# Patient Record
Sex: MALE | Race: BLACK OR AFRICAN AMERICAN | URBAN - METROPOLITAN AREA
[De-identification: names, ages, dates, MRNs, and addresses within clinical notes are randomized per-mention and may not be internally consistent; named-entity substitution may affect disease eponyms.]

---

## 2018-07-16 ENCOUNTER — HOSPITAL ENCOUNTER (OUTPATIENT)
Facility: CLINIC | Age: 38
Setting detail: OBSERVATION
Discharge: HOME | End: 2018-07-17
Attending: EMERGENCY MEDICINE | Admitting: INTERNAL MEDICINE
Payer: OTHER GOVERNMENT

## 2018-07-16 ENCOUNTER — APPOINTMENT (OUTPATIENT)
Dept: CT IMAGING | Facility: CLINIC | Age: 38
End: 2018-07-16
Attending: EMERGENCY MEDICINE
Payer: OTHER GOVERNMENT

## 2018-07-16 ENCOUNTER — APPOINTMENT (OUTPATIENT)
Dept: ULTRASOUND IMAGING | Facility: CLINIC | Age: 38
End: 2018-07-16
Attending: EMERGENCY MEDICINE
Payer: OTHER GOVERNMENT

## 2018-07-16 DIAGNOSIS — M25.561 ACUTE PAIN OF RIGHT KNEE: ICD-10-CM

## 2018-07-16 DIAGNOSIS — E78.2 MIXED HYPERLIPIDEMIA: Primary | ICD-10-CM

## 2018-07-16 DIAGNOSIS — R07.9 CHEST PAIN, UNSPECIFIED TYPE: ICD-10-CM

## 2018-07-16 DIAGNOSIS — E11.9 TYPE 2 DIABETES MELLITUS WITHOUT COMPLICATION, WITHOUT LONG-TERM CURRENT USE OF INSULIN (H): ICD-10-CM

## 2018-07-16 LAB
ALBUMIN SERPL-MCNC: 4.2 G/DL (ref 3.4–5)
ALP SERPL-CCNC: 59 U/L (ref 40–150)
ALT SERPL W P-5'-P-CCNC: 33 U/L (ref 0–70)
ANION GAP SERPL CALCULATED.3IONS-SCNC: 9 MMOL/L (ref 3–14)
AST SERPL W P-5'-P-CCNC: 38 U/L (ref 0–45)
BASOPHILS # BLD AUTO: 0 10E9/L (ref 0–0.2)
BASOPHILS NFR BLD AUTO: 0.2 %
BILIRUB SERPL-MCNC: 0.3 MG/DL (ref 0.2–1.3)
BUN SERPL-MCNC: 15 MG/DL (ref 7–30)
CALCIUM SERPL-MCNC: 9.1 MG/DL (ref 8.5–10.1)
CHLORIDE SERPL-SCNC: 98 MMOL/L (ref 94–109)
CO2 SERPL-SCNC: 27 MMOL/L (ref 20–32)
CREAT SERPL-MCNC: 1.1 MG/DL (ref 0.66–1.25)
D DIMER PPP FEU-MCNC: NORMAL UG/ML FEU (ref 0–0.5)
DIFFERENTIAL METHOD BLD: NORMAL
EOSINOPHIL # BLD AUTO: 0.1 10E9/L (ref 0–0.7)
EOSINOPHIL NFR BLD AUTO: 0.7 %
ERYTHROCYTE [DISTWIDTH] IN BLOOD BY AUTOMATED COUNT: 13 % (ref 10–15)
GFR SERPL CREATININE-BSD FRML MDRD: 74 ML/MIN/1.7M2
GLUCOSE SERPL-MCNC: 137 MG/DL (ref 70–99)
HCT VFR BLD AUTO: 40.7 % (ref 40–53)
HGB BLD-MCNC: 14 G/DL (ref 13.3–17.7)
IMM GRANULOCYTES # BLD: 0 10E9/L (ref 0–0.4)
IMM GRANULOCYTES NFR BLD: 0.3 %
INTERPRETATION ECG - MUSE: NORMAL
LYMPHOCYTES # BLD AUTO: 2.4 10E9/L (ref 0.8–5.3)
LYMPHOCYTES NFR BLD AUTO: 26.8 %
MCH RBC QN AUTO: 31.7 PG (ref 26.5–33)
MCHC RBC AUTO-ENTMCNC: 34.4 G/DL (ref 31.5–36.5)
MCV RBC AUTO: 92 FL (ref 78–100)
MONOCYTES # BLD AUTO: 1 10E9/L (ref 0–1.3)
MONOCYTES NFR BLD AUTO: 10.8 %
NEUTROPHILS # BLD AUTO: 5.5 10E9/L (ref 1.6–8.3)
NEUTROPHILS NFR BLD AUTO: 61.2 %
NRBC # BLD AUTO: 0 10*3/UL
NRBC BLD AUTO-RTO: 0 /100
PLATELET # BLD AUTO: 265 10E9/L (ref 150–450)
POTASSIUM SERPL-SCNC: 3.7 MMOL/L (ref 3.4–5.3)
PROT SERPL-MCNC: 8.3 G/DL (ref 6.8–8.8)
RBC # BLD AUTO: 4.41 10E12/L (ref 4.4–5.9)
SODIUM SERPL-SCNC: 134 MMOL/L (ref 133–144)
TROPONIN I SERPL-MCNC: <0.015 UG/L (ref 0–0.04)
WBC # BLD AUTO: 9 10E9/L (ref 4–11)

## 2018-07-16 PROCEDURE — 99285 EMERGENCY DEPT VISIT HI MDM: CPT | Mod: 25

## 2018-07-16 PROCEDURE — 99220 ZZC INITIAL OBSERVATION CARE,LEVL III: CPT | Performed by: INTERNAL MEDICINE

## 2018-07-16 PROCEDURE — 25000128 H RX IP 250 OP 636: Performed by: EMERGENCY MEDICINE

## 2018-07-16 PROCEDURE — 84484 ASSAY OF TROPONIN QUANT: CPT | Performed by: EMERGENCY MEDICINE

## 2018-07-16 PROCEDURE — 93971 EXTREMITY STUDY: CPT | Mod: RT

## 2018-07-16 PROCEDURE — 25000125 ZZHC RX 250: Performed by: EMERGENCY MEDICINE

## 2018-07-16 PROCEDURE — 93005 ELECTROCARDIOGRAM TRACING: CPT

## 2018-07-16 PROCEDURE — 96374 THER/PROPH/DIAG INJ IV PUSH: CPT | Mod: 59

## 2018-07-16 PROCEDURE — 85025 COMPLETE CBC W/AUTO DIFF WBC: CPT | Performed by: EMERGENCY MEDICINE

## 2018-07-16 PROCEDURE — 80053 COMPREHEN METABOLIC PANEL: CPT | Performed by: EMERGENCY MEDICINE

## 2018-07-16 PROCEDURE — 71260 CT THORAX DX C+: CPT

## 2018-07-16 RX ORDER — KETOROLAC TROMETHAMINE 15 MG/ML
15 INJECTION, SOLUTION INTRAMUSCULAR; INTRAVENOUS ONCE
Status: COMPLETED | OUTPATIENT
Start: 2018-07-16 | End: 2018-07-16

## 2018-07-16 RX ORDER — IOPAMIDOL 755 MG/ML
78 INJECTION, SOLUTION INTRAVASCULAR ONCE
Status: COMPLETED | OUTPATIENT
Start: 2018-07-16 | End: 2018-07-16

## 2018-07-16 RX ADMIN — SODIUM CHLORIDE 100 ML: 900 INJECTION, SOLUTION INTRAVENOUS at 22:01

## 2018-07-16 RX ADMIN — KETOROLAC TROMETHAMINE 15 MG: 15 INJECTION, SOLUTION INTRAMUSCULAR; INTRAVENOUS at 21:22

## 2018-07-16 RX ADMIN — IOPAMIDOL 78 ML: 755 INJECTION, SOLUTION INTRAVENOUS at 22:01

## 2018-07-16 ASSESSMENT — ENCOUNTER SYMPTOMS
MYALGIAS: 1
ARTHRALGIAS: 1
JOINT SWELLING: 1

## 2018-07-16 NOTE — IP AVS SNAPSHOT
MRN:[id number]                      After Visit Summary   7/16/2018    Hardik Ramirez    MRN: [id number]           Thank you!     Thank you for choosing [facility] for your care. Our goal is always to provide you with excellent care. Hearing back from our patients is one way we can continue to improve our services. Please take a few minutes to complete the written survey that you may receive in the mail after you visit with us. Thank you!        Patient Information     Date Of Birth          1980        About your hospital stay     You were admitted on:  July 17, 2018 You last received care in the:  [facility]    You were discharged on:  July 17, 2018        Reason for your hospital stay       You were admitted with chest discomfort and found to have left ventricular hypertrophy and type 2 diabetes. You did not have a heart attack or emergent heart problem.                  Who to Call     For medical emergencies, please call 911.  For non-urgent questions about your medical care, please call your primary care provider or clinic, None          Attending Provider     Provider Specialty    Antonio Haile MD Emergency Medicine    Debroux, Myrna TANNER MD Emergency Medicine    Roe, Kings Alvarado MD Internal Medicine       Primary Care Provider Fax #    Provider Not In System [phone]      After Care Instructions     Activity       Your activity upon discharge: activity as tolerated            Diet       Follow this diet upon discharge: Low fat/cholesterol, consistent carbohydrate diet, avoid added sugars.            Discharge Instructions       Call Dr. Lind at Pager [phone] if questions, or Cell Phone [phone].            Monitor and record       blood glucose 2-4 times a day, before meals and at bedtime and report findings to primary care provider                  Follow-up Appointments     Follow-up and recommended labs and tests        Follow up with primary care  "provider, within 7-14 days to evaluate medication change, to evaluate treatment change, for hospital follow- up and regarding new diagnosis.  The following labs/tests are recommended: repeat lipid panel and HbA1c in 3-6 months. Keep close eye on hypertension given new diagnosis of left ventricular hypertrophy and diabetes type 2.   Need to set up/arrange [facility] with [facility] or [facility].                  Pending Results     No orders found for last 3 day(s).            Statement of Approval     Ordered          18 1320  I have reviewed and agree with all the recommendations and orders detailed in this document.  EFFECTIVE NOW     Approved and electronically signed by:  Sandra Hampton PA-C             Admission Information     Date & Time Provider Department Dept. Phone    2018 Roe, Kings Alvarado MD [facility] [phone]      Your Vitals Were     Blood Pressure Pulse Temperature Respirations Height Weight    141/87 88 96.2  F (35.7  C) (Oral) 16 1.778 m (5' 10\") 101.5 kg (223 lb 12.8 oz)    Pulse Oximetry BMI (Body Mass Index)                96% 32.11 kg/m2          [employer] Information     [employer] lets you send messages to your doctor, view your test results, renew your prescriptions, schedule appointments and more. To sign up, go to [email or web address] . Click on \"Log in\" on the left side of the screen, which will take you to the Welcome page. Then click on \"Sign up Now\" on the right side of the page.     You will be asked to enter the access code listed below, as well as some personal information. Please follow the directions to create your username and password.     Your access code is: [email or web address]  Expires: 10/15/2018  1:23 PM     Your access code will  in 90 days. If you need help or a new code, please call your [facility] clinic or [phone].        Care EveryWhere ID     This is your Care EveryWhere ID. This could be used by " other organizations to access your [facility] medical records  [id number]        Equal Access to Services     ONEIL TORRES : Madhav Ponce, wajoshuada crista, luis albertojavon de jesusaniyahkatiuska rodriguez, ozzie joshilibbycheyanne paul. So [facility] [phone].    ATENCIÓN: Si habla español, tiene a gallagher disposición servicios gratuitos de asistencia lingüística. Llame al [phone].    We comply with applicable federal civil rights laws and [place] laws. We do not discriminate on the basis of race, color, national origin, age, disability, sex, sexual orientation, or gender identity.               Review of your medicines      START taking        Dose / Directions    alcohol swab prep pads        Use to swab area of injection/kevyn as directed.   Quantity:  100 each   Refills:  0       aspirin 81 MG EC tablet   Used for:  Mixed hyperlipidemia        Dose:  81 mg   Start taking on:  7/18/2018   Take 1 tablet (81 mg) by mouth daily   Quantity:  30 tablet   Refills:  0       blood glucose calibration solution   Commonly known as:  NO BRAND SPECIFIED        Use to calibrate blood glucose monitor as needed as directed.   Quantity:  1 Bottle   Refills:  0       blood glucose monitoring meter device kit   Commonly known as:  no brand specified        Use to test blood sugar 2-4 times daily or as directed.   Quantity:  1 kit   Refills:  0       blood glucose monitoring test strip   Commonly known as:  no brand specified        Use to test blood sugar 4 times daily or as directed.   Quantity:  100 strip   Refills:  0       thin lancets   Commonly known as:  NO BRAND SPECIFIED        Use with lanceting device.   Quantity:  100 each   Refills:  0         CONTINUE these medicines which may have CHANGED, or have new prescriptions. If we are uncertain of the size of tablets/capsules you have at home, strength may be listed as something that might have changed.        Dose / Directions    rosuvastatin 10 MG tablet   Commonly  known as:  CRESTOR   This may have changed:  how much to take   Used for:  Mixed hyperlipidemia        Dose:  20 mg   Take 2 tablets (20 mg) by mouth daily   Quantity:  30 tablet   Refills:  0         CONTINUE these medicines which have NOT CHANGED        Dose / Directions    AMBIEN PO        Dose:  10 mg   Take 10 mg by mouth nightly as needed for sleep   Refills:  0       IBUPROFEN PO        Dose:  200 mg   Take 200 mg by mouth every 6 hours as needed   Refills:  0       lisinopril-hydrochlorothiazide 20-25 MG per tablet   Commonly known as:  PRINZIDE/ZESTORETIC        Dose:  1 tablet   Take 1 tablet by mouth daily   Refills:  0       multivitamin, therapeutic with minerals Tabs tablet        Dose:  1 tablet   Take 1 tablet by mouth daily   Refills:  0       UNKNOWN TO PATIENT        daily Acid reflux med (suspected PPI)  Unable to verify at this point   Refills:  0            Where to get your medicines      These medications were sent to [facility] - [address]  [address], [place] [address]     Phone:  [phone]     aspirin 81 MG EC tablet    rosuvastatin 10 MG tablet         Some of these will need a paper prescription and others can be bought over the counter. Ask your nurse if you have questions.     Bring a paper prescription for each of these medications     alcohol swab prep pads    blood glucose calibration solution    blood glucose monitoring meter device kit    blood glucose monitoring test strip    thin lancets                Protect others around you: Learn how to safely use, store and throw away your medicines at www.disposemymeds.org.             Medication List: This is a list of all your medications and when to take them. Check marks below indicate your daily home schedule. Keep this list as a reference.      Medications           Morning Afternoon Evening Bedtime As Needed    alcohol swab prep pads   Use to swab area of injection/kevyn as  directed.                                AMBIEN PO   Take 10 mg by mouth nightly as needed for sleep   Last time this was given:  Resume as you normally do at home                                aspirin 81 MG EC tablet   Take 1 tablet (81 mg) by mouth daily   Start taking on:  7/18/2018                                blood glucose calibration solution   Commonly known as:  NO BRAND SPECIFIED   Use to calibrate blood glucose monitor as needed as directed.                                blood glucose monitoring meter device kit   Commonly known as:  no brand specified   Use to test blood sugar 2-4 times daily or as directed.                                blood glucose monitoring test strip   Commonly known as:  no brand specified   Use to test blood sugar 4 times daily or as directed.                                IBUPROFEN PO   Take 200 mg by mouth every 6 hours as needed   Last time this was given:  Resume as you normally do at home                                  lisinopril-hydrochlorothiazide 20-25 MG per tablet   Commonly known as:  PRINZIDE/ZESTORETIC   Take 1 tablet by mouth daily   Last time this was given:  1 tablet on 7/17/2018 11:45 AM   Last time this was given:  Next dose due tomorrow                                 multivitamin, therapeutic with minerals Tabs tablet   Take 1 tablet by mouth daily   Last time this was given:  Resume as you normally do at home                                  rosuvastatin 10 MG tablet   Commonly known as:  CRESTOR   Take 2 tablets (20 mg) by mouth daily   Last time this was given:  Resume tomorrow                                thin lancets   Commonly known as:  NO BRAND SPECIFIED   Use with lanceting device.                                UNKNOWN TO PATIENT   daily Acid reflux med (suspected PPI)  Unable to verify at this point   Last time this was given:  Resume as you normally do at home

## 2018-07-16 NOTE — IP AVS SNAPSHOT
[facility]    [address] [place] [id number]    Phone:  [phone]                                       After Visit Summary   7/16/2018    Hardik Ramirez    MRN: [id number]           After Visit Summary Signature Page     I have received my discharge instructions, and my questions have been answered. I have discussed any challenges I see with this plan with the nurse or doctor.    ..........................................................................................................................................  Patient/Patient Representative Signature      ..........................................................................................................................................  Patient Representative Print Name and Relationship to Patient    ..................................................               ................................................  Date                                            Time    ..........................................................................................................................................  Reviewed by Signature/Title    ...................................................              ..............................................  Date                                                            Time

## 2018-07-17 ENCOUNTER — APPOINTMENT (OUTPATIENT)
Dept: CARDIOLOGY | Facility: CLINIC | Age: 38
End: 2018-07-17
Attending: INTERNAL MEDICINE
Payer: OTHER GOVERNMENT

## 2018-07-17 VITALS
BODY MASS INDEX: 32.04 KG/M2 | OXYGEN SATURATION: 96 % | WEIGHT: 223.8 LBS | SYSTOLIC BLOOD PRESSURE: 141 MMHG | DIASTOLIC BLOOD PRESSURE: 87 MMHG | RESPIRATION RATE: 16 BRPM | HEIGHT: 70 IN | HEART RATE: 88 BPM | TEMPERATURE: 96.2 F

## 2018-07-17 PROBLEM — E11.9 TYPE 2 DIABETES MELLITUS (H): Status: ACTIVE | Noted: 2018-07-17

## 2018-07-17 PROBLEM — M25.561 RIGHT KNEE PAIN: Status: ACTIVE | Noted: 2018-07-17

## 2018-07-17 PROBLEM — R07.9 CHEST PAIN: Status: ACTIVE | Noted: 2018-07-17

## 2018-07-17 LAB
CHOLEST SERPL-MCNC: 175 MG/DL
D DIMER PPP FEU-MCNC: <0.3 UG/ML FEU (ref 0–0.5)
HBA1C MFR BLD: 6.8 % (ref 0–5.6)
HDLC SERPL-MCNC: 39 MG/DL
LDLC SERPL CALC-MCNC: 118 MG/DL
NONHDLC SERPL-MCNC: 136 MG/DL
TRIGL SERPL-MCNC: 91 MG/DL
TROPONIN I SERPL-MCNC: <0.015 UG/L (ref 0–0.04)
TROPONIN I SERPL-MCNC: <0.015 UG/L (ref 0–0.04)

## 2018-07-17 PROCEDURE — G0378 HOSPITAL OBSERVATION PER HR: HCPCS

## 2018-07-17 PROCEDURE — 99217 ZZC OBSERVATION CARE DISCHARGE: CPT | Performed by: PHYSICIAN ASSISTANT

## 2018-07-17 PROCEDURE — 93306 TTE W/DOPPLER COMPLETE: CPT | Mod: 26 | Performed by: INTERNAL MEDICINE

## 2018-07-17 PROCEDURE — 40000264 ECHO COMPLETE WITH OPTISON

## 2018-07-17 PROCEDURE — 80061 LIPID PANEL: CPT | Performed by: INTERNAL MEDICINE

## 2018-07-17 PROCEDURE — 25500064 ZZH RX 255 OP 636: Performed by: INTERNAL MEDICINE

## 2018-07-17 PROCEDURE — 84484 ASSAY OF TROPONIN QUANT: CPT | Performed by: INTERNAL MEDICINE

## 2018-07-17 PROCEDURE — 25000128 H RX IP 250 OP 636: Performed by: INTERNAL MEDICINE

## 2018-07-17 PROCEDURE — 99207 ZZC APP CREDIT; MD BILLING SHARED VISIT: CPT | Performed by: INTERNAL MEDICINE

## 2018-07-17 PROCEDURE — 83036 HEMOGLOBIN GLYCOSYLATED A1C: CPT | Performed by: INTERNAL MEDICINE

## 2018-07-17 PROCEDURE — 85379 FIBRIN DEGRADATION QUANT: CPT | Performed by: EMERGENCY MEDICINE

## 2018-07-17 PROCEDURE — 36415 COLL VENOUS BLD VENIPUNCTURE: CPT | Performed by: INTERNAL MEDICINE

## 2018-07-17 PROCEDURE — 25000132 ZZH RX MED GY IP 250 OP 250 PS 637: Performed by: INTERNAL MEDICINE

## 2018-07-17 RX ORDER — IBUPROFEN 600 MG/1
600 TABLET, FILM COATED ORAL EVERY 6 HOURS PRN
Status: DISCONTINUED | OUTPATIENT
Start: 2018-07-17 | End: 2018-07-17 | Stop reason: HOSPADM

## 2018-07-17 RX ORDER — LANCETS
EACH MISCELLANEOUS
Qty: 100 EACH | Refills: 0 | Status: SHIPPED | OUTPATIENT
Start: 2018-07-17

## 2018-07-17 RX ORDER — LANCETS
EACH MISCELLANEOUS
Qty: 100 EACH | Refills: 0 | Status: SHIPPED | OUTPATIENT
Start: 2018-07-17 | End: 2018-07-17

## 2018-07-17 RX ORDER — NALOXONE HYDROCHLORIDE 0.4 MG/ML
.1-.4 INJECTION, SOLUTION INTRAMUSCULAR; INTRAVENOUS; SUBCUTANEOUS
Status: DISCONTINUED | OUTPATIENT
Start: 2018-07-17 | End: 2018-07-17 | Stop reason: HOSPADM

## 2018-07-17 RX ORDER — GLUCOSAMINE HCL/CHONDROITIN SU 500-400 MG
CAPSULE ORAL
Qty: 100 EACH | Refills: 0 | Status: SHIPPED | OUTPATIENT
Start: 2018-07-17 | End: 2018-07-17

## 2018-07-17 RX ORDER — ROSUVASTATIN CALCIUM 10 MG/1
10 TABLET, COATED ORAL DAILY
Status: ON HOLD | COMMUNITY
End: 2018-07-17

## 2018-07-17 RX ORDER — ACETAMINOPHEN 325 MG/1
650 TABLET ORAL EVERY 4 HOURS PRN
Status: DISCONTINUED | OUTPATIENT
Start: 2018-07-17 | End: 2018-07-17 | Stop reason: HOSPADM

## 2018-07-17 RX ORDER — LISINOPRIL AND HYDROCHLOROTHIAZIDE 20; 25 MG/1; MG/1
1 TABLET ORAL DAILY
COMMUNITY

## 2018-07-17 RX ORDER — GLUCOSAMINE HCL/CHONDROITIN SU 500-400 MG
CAPSULE ORAL
Qty: 100 EACH | Refills: 0 | Status: SHIPPED | OUTPATIENT
Start: 2018-07-17

## 2018-07-17 RX ORDER — ROSUVASTATIN CALCIUM 10 MG/1
20 TABLET, COATED ORAL DAILY
Qty: 30 TABLET | Refills: 0 | Status: SHIPPED | OUTPATIENT
Start: 2018-07-17

## 2018-07-17 RX ORDER — LIDOCAINE 40 MG/G
CREAM TOPICAL
Status: DISCONTINUED | OUTPATIENT
Start: 2018-07-17 | End: 2018-07-17 | Stop reason: HOSPADM

## 2018-07-17 RX ORDER — ASPIRIN 81 MG/1
81 TABLET ORAL DAILY
Status: DISCONTINUED | OUTPATIENT
Start: 2018-07-18 | End: 2018-07-17 | Stop reason: HOSPADM

## 2018-07-17 RX ORDER — MULTIPLE VITAMINS W/ MINERALS TAB 9MG-400MCG
1 TAB ORAL DAILY
COMMUNITY

## 2018-07-17 RX ORDER — LISINOPRIL AND HYDROCHLOROTHIAZIDE 20; 25 MG/1; MG/1
1 TABLET ORAL DAILY
Status: DISCONTINUED | OUTPATIENT
Start: 2018-07-17 | End: 2018-07-17 | Stop reason: HOSPADM

## 2018-07-17 RX ORDER — LIDOCAINE 40 MG/G
CREAM TOPICAL 2 TIMES DAILY PRN
Status: DISCONTINUED | OUTPATIENT
Start: 2018-07-17 | End: 2018-07-17 | Stop reason: HOSPADM

## 2018-07-17 RX ORDER — ACETAMINOPHEN 650 MG/1
650 SUPPOSITORY RECTAL EVERY 4 HOURS PRN
Status: DISCONTINUED | OUTPATIENT
Start: 2018-07-17 | End: 2018-07-17 | Stop reason: HOSPADM

## 2018-07-17 RX ORDER — ALUMINA, MAGNESIA, AND SIMETHICONE 2400; 2400; 240 MG/30ML; MG/30ML; MG/30ML
30 SUSPENSION ORAL EVERY 4 HOURS PRN
Status: DISCONTINUED | OUTPATIENT
Start: 2018-07-17 | End: 2018-07-17 | Stop reason: HOSPADM

## 2018-07-17 RX ORDER — NITROGLYCERIN 0.4 MG/1
0.4 TABLET SUBLINGUAL EVERY 5 MIN PRN
Status: DISCONTINUED | OUTPATIENT
Start: 2018-07-17 | End: 2018-07-17 | Stop reason: HOSPADM

## 2018-07-17 RX ORDER — ASPIRIN 81 MG/1
162 TABLET, CHEWABLE ORAL ONCE
Status: COMPLETED | OUTPATIENT
Start: 2018-07-17 | End: 2018-07-17

## 2018-07-17 RX ADMIN — HUMAN ALBUMIN MICROSPHERES AND PERFLUTREN 9 ML: 10; .22 INJECTION, SOLUTION INTRAVENOUS at 08:46

## 2018-07-17 RX ADMIN — LISINOPRIL AND HYDROCHLOROTHIAZIDE 1 TABLET: 25; 20 TABLET ORAL at 11:45

## 2018-07-17 RX ADMIN — SODIUM CHLORIDE 1000 ML: 9 INJECTION, SOLUTION INTRAVENOUS at 01:57

## 2018-07-17 RX ADMIN — OMEPRAZOLE 40 MG: 20 CAPSULE, DELAYED RELEASE ORAL at 06:54

## 2018-07-17 RX ADMIN — ASPIRIN 81 MG 162 MG: 81 TABLET ORAL at 01:58

## 2018-07-17 NOTE — PHARMACY-ADMISSION MEDICATION HISTORY
Admission medication history interview status for the 7/16/2018  admission is complete. See EPIC admission navigator for prior to admission medications     Medication history source reliability:Good    Actions taken by pharmacist (provider contacted, etc): Attempted to contact [facility] ([phone]) to verify acid reflux medication. Connected to message line- would call back after 24 hours.      Additional medication history information not noted on PTA med list :None    Medication reconciliation/reorder completed by provider prior to medication history? Yes    Time spent in this activity: 10    Prior to Admission medications    Medication Sig Last Dose Taking? Auth Provider   IBUPROFEN PO Take 200 mg by mouth every 6 hours as needed PRn Yes Unknown, Entered By History   lisinopril-hydrochlorothiazide (PRINZIDE/ZESTORETIC) 20-25 MG per tablet Take 1 tablet by mouth daily 7/16/2018 at Unknown time Yes Reported, Patient   multivitamin, therapeutic with minerals (THERA-VIT-M) TABS tablet Take 1 tablet by mouth daily 7/16/2018 at Unknown time Yes Unknown, Entered By History   rosuvastatin (CRESTOR) 10 MG tablet Take 10 mg by mouth daily 7/15/2018 Yes Reported, Patient   UNKNOWN TO PATIENT daily Acid reflux med (suspected PPI)   Unable to verify at this point  Yes Reported, Patient   Zolpidem Tartrate (AMBIEN PO) Take 10 mg by mouth nightly as needed for sleep PRN Yes Unknown, Entered By History

## 2018-07-17 NOTE — ED NOTES
Bed: ED01  Expected date: 7/16/18  Expected time:   Means of arrival:   Comments:  Triage     David Gillespie(Attending)

## 2018-07-17 NOTE — PLAN OF CARE
Problem: Patient Care Overview  Goal: Plan of Care/Patient Progress Review  Outcome: No Change  PRIMARY DIAGNOSIS: CHEST PAIN  OUTPATIENT/OBSERVATION GOALS TO BE MET BEFORE DISCHARGE:  1. Ruled out acute coronary syndrome (negative or stable Troponin):  Yes  2. Pain Status: Pain free.  3. Appropriate provocative testing performed: No  -  Procedure: Echo  - Interpretation of cardiac rhythm per telemetry tech: NSR    4. Cleared by Consultants (if applicable):N/A  5. Return to near baseline physical activity: Yes  Discharge Planner Nurse   Safe discharge environment identified: Yes  Barriers to discharge: Yes       Entered by: Stephania Gonzalez 07/17/2018 9:30 AM     Please review provider order for any additional goals.   Nurse to notify provider when observation goals have been met and patient is ready for discharge.

## 2018-07-17 NOTE — PLAN OF CARE
Problem: Patient Care Overview  Goal: Plan of Care/Patient Progress Review  Outcome: No Change  PRIMARY DIAGNOSIS: CHEST PAIN  OUTPATIENT/OBSERVATION GOALS TO BE MET BEFORE DISCHARGE:  1. Ruled out acute coronary syndrome (negative or stable Troponin):  Yes  2. Pain Status: Pain free.  3. Appropriate provocative testing performed: Yes  -  Procedure: Echo  - Interpretation of cardiac rhythm per telemetry tech: NSR    4. Cleared by Consultants (if applicable):N/A  5. Return to near baseline physical activity: Yes  Discharge Planner Nurse   Safe discharge environment identified: Yes  Barriers to discharge: Yes       Entered by: Stephania Gonzalez 07/17/2018 12:57 PM     Please review provider order for any additional goals.   Nurse to notify provider when observation goals have been met and patient is ready for discharge.

## 2018-07-17 NOTE — PLAN OF CARE
Problem: Patient Care Overview  Goal: Plan of Care/Patient Progress Review  Outcome: No Change  Observation goals PRIOR TO DISCHARGE     Comments: List all goals to be met before discharge home:   - Serial troponins and stress test complete : met  - Seen and cleared by consultant if applicable : not met  - Adequate pain control on oral analgesia : met  - Vital signs normal or at patient baseline : met  - Safe disposition plan has been identified : met  - Nurse to notify provider when observation goals have been met and patient is ready for discharge.     Pt A&Ox4.  VSS on RA.  Tele NSR.  Denies interventions for pain at this time.  CMS intact.  Up independently in room.  IV SL.  Voiding adequately.  Nursing to continue to monitor.

## 2018-07-17 NOTE — H&P
[facility]    History and Physical  Hospitalist       Date of Admission:  7/16/2018    Assessment & Plan   Hardik Ramirez is a 37 year old male who presents with ongoing right knee pain following a minor injury and subsequent effusion 6 days ago as well as onset of left-sided chest pain over the past 24 hours found to have sinus tachycardia, LVH on EKG.    Atypical chest pain, abnormal EKG: Patient with an EKG demonstrating lateral T-wave inversions and electrical pattern consistent with left ventricular hypertrophy.  Suspect that patient does have a component of concentric LVH related to long-standing history of hypertension.  Suspect chest discomfort is actually musculoskeletal in nature as it is reproducible and EKG abnormalities are actually secondary to chronic concentric LVH.  -TTE In a.m.  -Cardiac telemetry  -We will complete troponin trend.  Low suspicion for ACS at this time.  -Given family history of coronary artery disease, hypertension, hyperlipidemia, and what appears to be impaired glucose tolerance, patient likely would benefit from daily 81 mg aspirin for primary prevention.  This can be continued on discharge.  -Daily aspirin  -Acetaminophen, as needed ibuprofen, lidocaine cream for chest wall discomfort.  Would administer aspirin 30 minutes prior to ibuprofen  -With any change in chest discomfort, would repeat EKG to ensure stability, potentially transition to stress imaging of the heart.  At this time, given patient's exercise capacity and lack of prior chest discomfort, low HEART score, no stress imaging has been ordered.    Right knee pain: Suspect meniscal injury.  Patient describes jumping off of the bed of a truck and having initially no pain, later effusion with increased pain.  Effusion has improved since 6/12/18 evaluation in [place].  X-ray report from that time reviewed demonstrating normal joint spaces, no fracture.  -Acetaminophen, ibuprofen  -Can continue brace as  prescribed at outside hospital as this may help with pain over the coming days as needed improves  -Can continue crutches if needed as prescribed at outside hospital, though patient not currently utilizing  -Could use lidocaine Cream for knee pain as well    Hyperlipidemia:  -Continue prior to admission Crestor at discharge  -Repeat lipid panel pending to assess efficacy of hyperlipidemia treatment    Hypertension: Fair control currently with systolic blood pressure in the 140s range.  Patient states that by his home blood pressure cuff, he is typically in the 120 systolic range on his antihypertensive regimen.  -Continue prior to admission lisinopril hydrochlorothiazide    Elevated blood glucose: Possible impaired glucose tolerance.  Last ate beef stew several hours ago.  Patient with a blood glucose in the 130s range in the emergency department.  No history of diabetes.  -Hemoglobin A1c pending for a.m.    # Pain Assessment:  Current Pain Score 7/16/2018   Pain score (0-10) 8   - Hardik is experiencing pain due to suspected musculoskeletal anterior chest wall pain. Pain management was discussed and the plan was created in a collaborative fashion.  Hardik's response to the current recommendations: Engaged   plan is to treat chest discomfort with ibuprofen, acetaminophen, topical lidocaine.  Patient felt nauseous and flushed following tramadol prescribed 7/12 for knee injury.  Would avoid narcotics as I do not see a clear indication for this.    DVT Prophylaxis: Low Risk/Ambulatory with no VTE prophylaxis indicated    Code Status: Full Code    Disposition: Expected discharge 7/17/18 afternoon following TTE results, negative troponin trend.    Kings Alvarado [place]    Primary Care Physician   PCP in [place], [place]    Chief Complaint   Right knee pain, gnawing L chest wall pain.    History is obtained from the patient, chart review, discussion with Dr Mendez in the emergency department, review of outside records including  "recent knee XR report from OSH.    History of Present Illness   Hardik Ramirez is a 37 year old male who presents with right knee pain, secondary complaints of left chest wall discomfort.    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  His primary care provider is stationed in [place], [place].  Last week, 7/12/18, while in [place], patient describes jumping out of the back of a truck while loading and unloading items and injuring his right knee.  Patient states that he initially did not have pain, though over the course of the day, developed a large effusion associated with pain.  Patient subsequently was evaluated at [facility] in [place], [place].  4 view x-ray of the knee was performed demonstrating normal joint spaces, no fracture, and patient was discharged on tramadol for pain control, given a knee brace, and recommended to use crutches.  Patient states he took 1 dose of tramadol, it made him feel flushed and nauseous, and he discontinued this medication.    Since the 12th, Patient's effusion has gone down, minimal residual effusion of right knee.  still with range of motion intact.  Patient is not using crutches, and remains ambulatory. Does have pain, however.  Patient noted worsening right knee pain which he describes as radiating into the back of his knee and slightly down his calf.  Pain is centralized around residual effusion on the anterior lateral aspect of the joint line.  No fevers.  Increased discomfort was associated with a car trip from [place] to [place] for next speaking engagement.    Also during car trip, patient noted a \"gnawing\" left chest wall pain at approximately the T3 level midclavicular.  Chest discomfort is reproducible with palpation.  No injury, patient has not done any heavy lifting or weight training recently.  " Has never had similar discomfort in the past.  Does have a history of acid reflux for which he takes a daily medication.  He cannot recall the name of this medication, though it sounds as though he is taking a proton pump inhibitor.    Patient is a non-smoker.  Patient does have a family history of coronary artery disease in his father, who is currently in his 50s.  Patient personally has a history of hyperlipidemia and hypertension which he describes as controlled on hydrochlorothiazide/lisinopril and Crestor.    In the emergency department an EKG was obtained which demonstrated an electrical pattern consistent with LVH.  Nonspecific T-wave inversions in lateral leads were present.  Hospitalist service was contacted for ACS rule out and evaluation of EKG abnormalities.  Patient states that, approximately 10 years ago while stationed in [facility], he had an EKG for screening purposes and was subsequently referred to an [employer] physician.  States that someone was worried that he was having a heart attack with his EKG.  Never underwent echocardiogram, though was essentially cleared by physician following an extensive interview.  Patient's hypertension history predated this EKG as he has had hypertension greater than 10 years, was actually on a different antihypertensive regimen which was less efficacious at that time per his recollection.     Patient has excellent exercise tolerance at baseline.  States that he does have some degenerative disease in his knees bilaterally (which was not reported on outside x-ray report).  Prior to right knee injury this past week, could run an 8 minute mile without significant dyspnea and no associated chest discomfort.    Patient has never had a history of syncope or near syncope associated with exertion or exercise.  Patient has mild sinus tachycardia currently, is unaware of what his baseline heart rate is.    Past Medical History    I have reviewed this patient's medical history  and updated it with pertinent information if needed.   Hypertension, hyperlipidemia, acid reflux    Past Surgical History   I have reviewed this patient's surgical history and updated it with pertinent information if needed.  No pertinent past surgical history    Prior to Admission Medications   Prior to Admission Medications   Prescriptions Last Dose Informant Patient Reported? Taking?   UNKNOWN TO PATIENT   Yes Yes   Sig: daily Acid reflux med (suspected PPI)   lisinopril-hydrochlorothiazide (PRINZIDE/ZESTORETIC) 20-25 MG per tablet   Yes Yes   Sig: Take 1 tablet by mouth daily   rosuvastatin (CRESTOR) 10 MG tablet   Yes Yes   Sig: Take 10 mg by mouth daily      Facility-Administered Medications: None     Allergies   No Known Allergies    Social History   I have reviewed this patient's social history and updated it with pertinent information if needed. Hardik Ramirez  reports that he has never smoked (Had a cigar once when returning from [facility]). He has never used smokeless tobacco. He reports that he drinks alcohol. He reports that he does not use illicit drugs.     Family History   I have reviewed this patient's family history and updated it with pertinent information if needed.   Father with myocardial infarction in his mid 50s.    Review of Systems   The 10 point Review of Systems is negative other than noted in the HPI or here.   No vomiting  No diarrhea  No shortness of breath  No diaphoresis    Physical Exam   Temp: 97.4  F (36.3  C) Temp src: Oral BP: 144/87   Heart Rate: 113 Resp: 14 SpO2: 98 % O2 Device: None (Room air)    Vital Signs with Ranges  Temp:  [97.4  F (36.3  C)] 97.4  F (36.3  C)  Heart Rate:  [113] 113  Resp:  [14] 14  BP: (144)/(87) 144/87  SpO2:  [96 %-98 %] 98 %  223 lbs 0 oz    Constitutional: no acute distress, alert, conversant, well-developed -American male laying in bed in no discomfort  Eyes: no scleral icterus or injection  HEENT: moist mucous membranes  Respiratory:  breath sounds clear bilaterally to auscultation, no wheezes, no crackles  Cardiovascular: regular rate and rhythm, no murmur  GI: abdomen soft, non-tender, normoactive bowel sounds, no masses  Lymph/Hematologic: no lower extremity swelling  Genitourinary: not examined  Skin: no rashes  Musculoskeletal: muscular tone intact in all extremities.  No crepitus of right knee.  Small anterior lateral effusion without overlying warmth which is improved from prior per patient's report.  Mild tenderness to palpation of lateral joint line on right Knee.  Patient does have some reproducible anterior left-sided chest wall pain at approximately T3 level midclavicular.  Neurologic: mental status grossly intact, no focal deficits, alert  Psychiatric: normal affect, very pleasant    Data   Data reviewed today:  I personally reviewed the EKG tracing showing LVH pattern, lateral T-wave inversions.  Mild tachycardia with heart rate of 101    Recent Labs  Lab 07/16/18  2117   WBC 9.0   HGB 14.0   MCV 92         POTASSIUM 3.7   CHLORIDE 98   CO2 27   BUN 15   CR 1.10   ANIONGAP 9   LOY 9.1   *   ALBUMIN 4.2   PROTTOTAL 8.3   BILITOTAL 0.3   ALKPHOS 59   ALT 33   AST 38   TROPI <0.015       Recent Results (from the past 24 hour(s))   US Lower Extremity Venous Duplex Right    Narrative    VENOUS ULTRASOUND RIGHT LEG  7/16/2018 9:52 PM     HISTORY: pain, dvt;     COMPARISON: None.    FINDINGS:  Examination of the deep veins with graded compression and  color flow Doppler with spectral wave form analysis shows no evidence  of thrombus in the common femoral vein, femoral vein, popliteal vein  or calf veins.  There is no venous insufficiency.  No fluid  collections are identified.      Impression    IMPRESSION: No evidence of deep venous thrombosis.    CHAI WANG MD   Chest CT, IV contrast only - PE protocol    Narrative    CT CHEST PULMONARY EMBOLISM WITH CONTRAST  7/16/2018 10:05 PM    HISTORY: Chest pain, leg pain,  tachycardia, EKG changes, same.    TECHNIQUE: Scans obtained from the apices through the diaphragm with  IV contrast. 78 mL Isovue-370 injected. Radiation dose for this scan  was reduced using automated exposure control, adjustment of the mA  and/or kV according to patient size, or iterative reconstruction  technique.    COMPARISON:  None.    FINDINGS:  Evaluation of the pulmonary arterial system shows no  evidence of embolus. There is no aortic aneurysm or dissection. The  heart size is normal. There is no mediastinal, hilar or axillary lymph  node enlargement. There is mild dependent atelectasis bilaterally. The  lungs are otherwise clear. No pneumothorax or pleural effusion. Images  through the upper abdomen show no acute abnormalities.      Impression    IMPRESSION: There is no pulmonary embolus, aortic aneurysm or  dissection. No acute chest abnormality.     STEPHANIE RDORIGES MD

## 2018-07-17 NOTE — ED NOTES
"[facility]  ED Nurse Handoff Report    ED Chief complaint: Knee Pain (Right knee pain.  Knee swelling, pain since Tues.  Seen on Thurs in [place].  Dx with knee effusion.  Still having pain. ) and Chest Pain (Left chest pain, started yesterday.  )      ED Diagnosis:   Final diagnoses:   Chest pain, unspecified type   Acute pain of right knee       Code Status: Full Code    Allergies: No Known Allergies    Activity level - Baseline/Home:  Independent    Activity Level - Current:   SBA     Needed?: No    Isolation: No  Infection: Not Applicable  Bariatric?: No    Vital Signs:   Vitals:    07/16/18 2046 07/16/18 2151 07/16/18 2300 07/16/18 2315   BP: 144/87      Resp: 14      Temp: 97.4  F (36.3  C)      TempSrc: Oral      SpO2: 97% 97% 96% 98%   Weight: 101.2 kg (223 lb)      Height: 1.803 m (5' 11\")          Cardiac Rhythm: ,        Pain level: 0-10 Pain Scale: 8    Is this patient confused?: No   Bruno - Suicide Severity Rating Scale Completed?  Yes  If yes, what color did the patient score?  White    Patient Report: Initial Complaint: C/O CP and R knee pain  Focused Assessment: A & O.  VSS.  C/O CP, tender with palpation.  R knee pain after jumping off of a truck; pain wraps around to the calf.  Pain decreased some with Toradol.   Tests Performed: labs, US, CT  Abnormal Results:   Results for orders placed or performed during the hospital encounter of 07/16/18 (from the past 24 hour(s))   EKG 12 lead   Result Value Ref Range    Interpretation ECG Click View Image link to view waveform and result    CBC with platelets differential   Result Value Ref Range    WBC 9.0 4.0 - 11.0 10e9/L    RBC Count 4.41 4.4 - 5.9 10e12/L    Hemoglobin 14.0 13.3 - 17.7 g/dL    Hematocrit 40.7 40.0 - 53.0 %    MCV 92 78 - 100 fl    MCH 31.7 26.5 - 33.0 pg    MCHC 34.4 31.5 - 36.5 g/dL    RDW 13.0 10.0 - 15.0 %    Platelet Count 265 150 - 450 10e9/L    Diff Method Automated Method     % Neutrophils 61.2 % "    % Lymphocytes 26.8 %    % Monocytes 10.8 %    % Eosinophils 0.7 %    % Basophils 0.2 %    % Immature Granulocytes 0.3 %    Nucleated RBCs 0 0 /100    Absolute Neutrophil 5.5 1.6 - 8.3 10e9/L    Absolute Lymphocytes 2.4 0.8 - 5.3 10e9/L    Absolute Monocytes 1.0 0.0 - 1.3 10e9/L    Absolute Eosinophils 0.1 0.0 - 0.7 10e9/L    Absolute Basophils 0.0 0.0 - 0.2 10e9/L    Abs Immature Granulocytes 0.0 0 - 0.4 10e9/L    Absolute Nucleated RBC 0.0    D dimer quantitative   Result Value Ref Range    D Dimer Canceled, Test credited 0.0 - 0.50 ug/ml FEU   Comprehensive metabolic panel   Result Value Ref Range    Sodium 134 133 - 144 mmol/L    Potassium 3.7 3.4 - 5.3 mmol/L    Chloride 98 94 - 109 mmol/L    Carbon Dioxide 27 20 - 32 mmol/L    Anion Gap 9 3 - 14 mmol/L    Glucose 137 (H) 70 - 99 mg/dL    Urea Nitrogen 15 7 - 30 mg/dL    Creatinine 1.10 0.66 - 1.25 mg/dL    GFR Estimate 74 >60 mL/min/1.7m2    GFR Estimate If Black 90 >60 mL/min/1.7m2    Calcium 9.1 8.5 - 10.1 mg/dL    Bilirubin Total 0.3 0.2 - 1.3 mg/dL    Albumin 4.2 3.4 - 5.0 g/dL    Protein Total 8.3 6.8 - 8.8 g/dL    Alkaline Phosphatase 59 40 - 150 U/L    ALT 33 0 - 70 U/L    AST 38 0 - 45 U/L   Troponin I   Result Value Ref Range    Troponin I ES <0.015 0.000 - 0.045 ug/L   US Lower Extremity Venous Duplex Right    Narrative    VENOUS ULTRASOUND RIGHT LEG  7/16/2018 9:52 PM     HISTORY: pain, dvt;     COMPARISON: None.    FINDINGS:  Examination of the deep veins with graded compression and  color flow Doppler with spectral wave form analysis shows no evidence  of thrombus in the common femoral vein, femoral vein, popliteal vein  or calf veins.  There is no venous insufficiency.  No fluid  collections are identified.      Impression    IMPRESSION: No evidence of deep venous thrombosis.    CHAI WANG MD   Chest CT, IV contrast only - PE protocol    Narrative    CT CHEST PULMONARY EMBOLISM WITH CONTRAST  7/16/2018 10:05 PM    HISTORY: Chest pain, leg pain,  tachycardia, EKG changes, same.    TECHNIQUE: Scans obtained from the apices through the diaphragm with  IV contrast. 78 mL Isovue-370 injected. Radiation dose for this scan  was reduced using automated exposure control, adjustment of the mA  and/or kV according to patient size, or iterative reconstruction  technique.    COMPARISON:  None.    FINDINGS:  Evaluation of the pulmonary arterial system shows no  evidence of embolus. There is no aortic aneurysm or dissection. The  heart size is normal. There is no mediastinal, hilar or axillary lymph  node enlargement. There is mild dependent atelectasis bilaterally. The  lungs are otherwise clear. No pneumothorax or pleural effusion. Images  through the upper abdomen show no acute abnormalities.      Impression    IMPRESSION: There is no pulmonary embolus, aortic aneurysm or  dissection. No acute chest abnormality.     STEPHANIE RODRIGES MD     Treatments provided: Toradol    Family Comments: NA    OBS brochure/video discussed/provided to patient: Yes    ED Medications:   Medications   ketorolac (TORADOL) injection 15 mg (15 mg Intravenous Given 7/16/18 2122)   100mL saline flush (100 mLs Intravenous Given 7/16/18 2201)   iopamidol (ISOVUE-370) solution 78 mL (78 mLs Intravenous Given 7/16/18 2201)       Drips infusing?:  No    For the majority of the shift this patient was Green.   Interventions performed were NA.    Severe Sepsis OR Septic Shock Diagnosis Present: No      ED NURSE PHONE NUMBER: *[phone]

## 2018-07-17 NOTE — PROGRESS NOTES
Met with the patient at the bedside regarding discharge planning.  He showed me the education books that were provided for Diabetes and Managing diabetes.  He tells me [place] and that he will work with his PCP regarding glucometer and BGM as well as Nutritionist consult.  Patient is A+O x4 independent in the room.  Patient voices no needs for d/c at this time.  Updated bedside RN.

## 2018-07-17 NOTE — PLAN OF CARE
Problem: Patient Care Overview  Goal: Plan of Care/Patient Progress Review  Outcome: No Change  Observation goals PRIOR TO DISCHARGE     Comments: List all goals to be met before discharge home:   - Serial troponins and stress test complete : met  - Seen and cleared by consultant if applicable : not met  - Adequate pain control on oral analgesia : met  - Vital signs normal or at patient baseline : met  - Safe disposition plan has been identified : met  - Nurse to notify provider when observation goals have been met and patient is ready for discharge.

## 2018-07-17 NOTE — PLAN OF CARE
Problem: Patient Care Overview  Goal: Plan of Care/Patient Progress Review  Outcome: Adequate for Discharge Date Met: 07/17/18  Patient is alert and oriented x 4, denies any pain nor SOB,lungs are clear bilateral. Given diabetes education booklet and discharge instruction reviewed with patient and he verbalized understanding. Printed script for blood sugar monitoring supplies sent with patient.

## 2018-07-17 NOTE — ED PROVIDER NOTES
"  History     Chief Complaint:  Knee pain & Chest pain    HPI   Hardik Ramirez is a 37 year old male who presents with knee pain and chest pain. He is in the , and jumped off of a truck on Tuesday (6 days ago) and noticed right knee pain. His knee was also swollen that day, which has since lessened. The pain also goes down the back of his calf. He was seen on Thursday in [place], they took x-rays. He has a secondary complaint of left-sided chest pain which started yesterday. He also notes that he was in [place] a week ago, before getting stationed in [place].     Allergies:  No Known Drug Allergies    Medications:    The patient is not currently taking any prescribed medications.    Past Medical History:    The patient denies any significant past medical history.    Past Surgical History:    The patient does not have any pertinent past surgical history.    Family History:    No past pertinent family history.    Social History:  Tobacco use: No  Alcohol use: Yes  The patient presents on his own.       Review of Systems   Cardiovascular: Positive for chest pain.   Musculoskeletal: Positive for arthralgias, joint swelling and myalgias.   10 point review of systems performed and is negative except as above and in HPI.    Physical Exam   Vitals:  Patient Vitals for the past 24 hrs:   BP Temp Temp src Heart Rate Resp SpO2 Height Weight   07/16/18 2151 - - - - - 97 % - -   07/16/18 2046 144/87 97.4  F (36.3  C) Oral 113 14 97 % 1.803 m (5' 11\") 101.2 kg (223 lb)         Physical Exam    General: Resting on the gurney, very pleasant.  Head:  The scalp, face, and head appear normal  Mouth/Throat: Mucus membranes are moist  CV:  Heart rapid, but regular rate    Normal S1 and S2  No pathological murmur   Resp:  Breath sounds clear and equal bilaterally    Non-labored, no retractions or accessory muscle use    No coarseness    No wheezing   GI:  Abdomen is soft, no rigidity    No tenderness to palpation  MS:  Right " knee tender to palpation. Pain in the right posterior calf. Left anterior chest wall somewhat tender to palpation, no deformity.  Skin:  No rash or lesions noted.  Neuro: Speech is normal and fluent. No apparent deficit.  Psych:  Awake. Alert.  Normal affect.      Appropriate interactions.    Emergency Department Course     ECG:  ECG taken at 2045, ECG read at 2053  Sinus tachycardia  T wave inversion  Possible hypertrophy  Abnormal ECG  Rate 101 bpm. KS interval 168. QRS duration 94. QT/QTc 338/438. P-R-T axes 81,  65,   65.    Imaging:  Radiology findings were communicated with the patient who voiced understanding of the findings.  Chest CT, IV contrast only - PE protocol  IMPRESSION: There is no pulmonary embolus, aortic aneurysm or  dissection. No acute chest abnormality.   Reading per radiology.     US Lower Extremity Venous Duplex Right  IMPRESSION: No evidence of deep venous thrombosis.  Reading per radiology.     Laboratory:  Laboratory findings were communicated with the patient who voiced understanding of the findings.  CBC: AWNL (WBC 9.0, HGB 14.0, )  CMP: Glucose: 137(H) WNL (Creatinine 1.10)  Troponin (Collected 2117): <0.015    Interventions:  2201 Normal Saline 1000 mL IV  2122 Toradol 15 mg IV     Emergency Department Course:  Nursing notes and vitals reviewed.  I performed an exam of the patient as documented above.   IV was inserted and blood was drawn for laboratory testing, results above.  The patient was sent for a CT, US while in the emergency department, results above.     2322 I spoke with Dr. Roe regarding the patient    I discussed the treatment plan with the patient. They expressed understanding of this plan and consented to admission. I discussed the patient with Dr. Roe, who will admit the patient to a monitored bed for further evaluation and treatment.    I personally reviewed the laboratory results with the Patient and answered all related questions prior to  admission.    Impression & Plan      Medical Decision Making:  Hardik Ramirez is a 37 year old male who presents the emergency department complaining of right knee, posterior leg, and chest pain.  The patient had a recent history of bumping his knee but is noted that the pain is now become behind the leg and into the calf and he also has associated chest pain.  On examination his chest pain is most consistent with musculoskeletal chest pain however, he is tachycardic and his EKG shows T-wave inversions in the lateral leads and T-wave changes in V1 through 3 as well as evidence of LVH.  The patient tells me that he had an EKG that was abnormal many years ago but never any investigation including no echo or stress test at that time.  He reports that this EKG [place]  base and there is no way to get the prior EKG for comparison.  Given his symptoms and EKG abnormality I do believe that further evaluation with echo and checking troponins is indicated.  The patient was discussed with Dr. Roe who will place patient on observation for further evaluation of his chest pain and EKG abnormality.  The patient is comfortable this plan and will receive further care on observation.    Diagnosis:    ICD-10-CM    1. Chest pain, unspecified type R07.9    2. Acute pain of right knee M25.561        Disposition:   Admit      Scribe Disclosure:  VALDEMAR, Zulma Ferrera, am serving as a scribe at 9:21 PM on 7/16/2018 to document services personally performed by Myrna Mendez MD, based on my observations and the provider's statements to me.     EMERGENCY DEPARTMENT       Myrna Mendez MD  07/17/18 8911

## 2018-07-17 NOTE — PROGRESS NOTES
RECEIVING UNIT ED HANDOFF REVIEW    ED Nurse Handoff Report was reviewed by: Jahaira Wise on July 17, 2018 at 12:37 AM

## 2021-04-08 ENCOUNTER — OFFICE VISIT CONVERTED (OUTPATIENT)
Dept: ORTHOPEDIC SURGERY | Facility: CLINIC | Age: 41
End: 2021-04-08
Attending: ORTHOPAEDIC SURGERY

## 2021-04-22 ENCOUNTER — HOSPITAL ENCOUNTER (OUTPATIENT)
Dept: MRI IMAGING | Facility: HOSPITAL | Age: 41
Discharge: HOME | End: 2021-04-22
Attending: ORTHOPAEDIC SURGERY

## 2021-04-27 ENCOUNTER — OFFICE VISIT CONVERTED (OUTPATIENT)
Dept: ORTHOPEDIC SURGERY | Facility: CLINIC | Age: 41
End: 2021-04-27
Attending: ORTHOPAEDIC SURGERY

## 2021-05-11 NOTE — H&P
History and Physical      Patient Name: Guanako Martinez   Patient ID: [id number]   Sex: Male   YOB: 1980        Visit Date: April 8, 2021    Provider: Sher Aguirre MD   Location: [facility]   Location Address: [address] [place] [address]  [id number]   Location Phone: [phone]          Chief Complaint  · Left knee pain       History Of Present Illness  Guanako Martinez is a 40 year old male who presents today to [facility].      The patient presents here today for evaluation of his left knee. The patient was doing a pull up and came down on his knee wrong on March 1st. He reports his knee swelled up. He presented to [facility] ER and had x-rays that were negative for a fracture. He has a knee brace. He has no other complaints.       Past Medical History  Seasonal allergies         Past Surgical History  Eye Implant         Medication List  Crestor 20 mg oral tablet; hydrochlorothiazide 12.5 mg oral capsule; Mobic 7.5 mg oral tablet; Naprosyn 500 mg oral tablet; Zanaflex 4 mg oral capsule; Zestril 20 mg oral tablet         Allergy List  NO KNOWN DRUG ALLERGIES       Allergies Reconciled  Family Medical History  Stroke         Social History  Alcohol Use (Current some day); lives alone; Recreational Drug Use (Never); Single.; Tobacco (Never); Working         Review of Systems  · Constitutional  o Denies  o : fever, chills, weight loss  · Cardiovascular  o Denies  o : chest pain, shortness of breath  · Gastrointestinal  o Denies  o : liver disease, heartburn, nausea, blood in stools  · Genitourinary  o Denies  o : painful urination, blood in urine  · Integument  o Denies  o : rash, itching  · Neurologic  o Denies  o : headache, weakness, loss of consciousness  · Musculoskeletal  o Denies  o : painful, swollen joints  · Psychiatric  o Denies  o : drug/alcohol addiction, anxiety, depression      Vitals  Date Time BP Position Site L\R Cuff Size HR RR TEMP (F) WT  HT  BMI kg/m2 BSA  "m2 O2 Sat FR L/min FiO2 HC       04/08/2021 03:53 PM      80 - R   230lbs 0oz 5'  10\" 33 2.27 98 %            Physical Examination  · Constitutional  o Appearance  o : well developed, well-nourished, no obvious deformities present  · Head and Face  o Head  o :   § Inspection  § : normocephalic  o Face  o :   § Inspection  § : no facial lesions  · Eyes  o Conjunctivae  o : conjunctivae normal  o Sclerae  o : sclerae white  · Ears, Nose, Mouth and Throat  o Ears  o :   § External Ears  § : appearance within normal limits  § Hearing  § : intact  o Nose  o :   § External Nose  § : appearance normal  · Neck  o Inspection/Palpation  o : normal appearance  o Range of Motion  o : full range of motion  · Respiratory  o Respiratory Effort  o : breathing unlabored  o Inspection of Chest  o : normal appearance  o Auscultation of Lungs  o : no audible wheezing or rales  · Cardiovascular  o Heart  o : regular rate  · Gastrointestinal  o Abdominal Examination  o : soft and non-tender  · Skin and Subcutaneous Tissue  o General Inspection  o : intact, no rashes  · Psychiatric  o General  o : Alert and oriented x3  o Judgement and Insight  o : judgment and insight intact  o Mood and Affect  o : mood normal, affect appropriate  · Left Knee  o Inspection  o : ROM 0-90 degrees. Stable to anterior and posterior drawer. Stable to varus and valgus stress. Tender to anterior knee and medial joint line. Patella stable to stress. Pain with Isamar's. Mild swelling. No effusion. Positive EHL, FHL, GS, and TA. Sensation intact to light touch all 5 nerves of the foot. Positive Pulses.   · Imaging  o Imaging  o : [facility] X-ray 3/2021- Leftkneeseries demonstratingminimalpatellarspurring. Smalljointeffusion.          Assessment  · Left knee pain, unspecified chronicity     719.46/M25.562  · Knee sprain     844.9/S83.90XA  · Knee injury     959.7/S89.90XA      Plan  · Medications  o Medications have been Reconciled  o Transition of Care or Provider " Policy  · Instructions  o Reviewed the patient's Past Medical, Social, and Family history as well as the ROS at today's visit, no changes.  o Call or return if worsening symptoms.  o The above service was scribed by Nelly Multani on my behalf and I attest to the accuracy of the note. jsb  o Discussed the treatment plan with the patient. Plan for MRI of the left knee. Plan to continue knee brace. Prescription for Naproxen given today. Follow up after MRI for results.   o Electronically Identified Patient Education Materials Provided Electronically  · Referrals  o ID: [id number] Date: 04/08/2021 Type: Inbound  Specialty: Orthopedic Surgery            Electronically Signed by: Nelly Multani MA -Author on April 9, 2021 02:19:01 PM  Electronically Co-signed by: Sher Aguirre MD -Reviewer on April 11, 2021 07:00:16 PM

## 2021-05-14 VITALS — HEIGHT: 70 IN | OXYGEN SATURATION: 98 % | BODY MASS INDEX: 32.93 KG/M2 | HEART RATE: 80 BPM | WEIGHT: 230 LBS

## 2021-05-14 VITALS — BODY MASS INDEX: 32.53 KG/M2 | HEIGHT: 70 IN | WEIGHT: 227.25 LBS

## 2021-05-14 NOTE — PROGRESS NOTES
Progress Note      Patient Name: Guanako Martinez   Patient ID: [id number]   Sex: Male   YOB: 1980        Visit Date: April 27, 2021    Provider: Sher Aguirre MD   Location: [facility]   Location Address: [address] [place] [address]  [id number]   Location Phone: [phone]          Chief Complaint  · Left knee pain      History Of Present Illness  Guanako Martinez is a 40 year old male who presents today to [facility].      The patient presents here today for follow up evaluation of his left knee. The patient has been having knee pain especially when he runs. He states he was doing pulls ups and came down on his knee wrong on March 1st. He recently had and MRI and is here today for those results. He has no prior surgery to the knee.       Past Medical History  Seasonal allergies         Past Surgical History  Eye Implant         Medication List  Crestor 20 mg oral tablet; hydrochlorothiazide 12.5 mg oral capsule; Mobic 7.5 mg oral tablet; Naprosyn 500 mg oral tablet; Zanaflex 4 mg oral capsule; Zestril 20 mg oral tablet         Allergy List  NO KNOWN DRUG ALLERGIES         Family Medical History  Stroke         Social History  Alcohol Use (Current some day); lives alone; Recreational Drug Use (Never); Single.; Tobacco (Never); Working         Review of Systems  · Constitutional  o Denies  o : fever, chills, weight loss  · Cardiovascular  o Denies  o : chest pain, shortness of breath  · Gastrointestinal  o Denies  o : liver disease, heartburn, nausea, blood in stools  · Genitourinary  o Denies  o : painful urination, blood in urine  · Integument  o Denies  o : rash, itching  · Neurologic  o Denies  o : headache, weakness, loss of consciousness  · Musculoskeletal  o Denies  o : painful, swollen joints  · Psychiatric  o Denies  o : drug/alcohol addiction, anxiety, depression      Vitals  Date Time BP Position Site L\R Cuff Size HR RR TEMP (F) WT  HT  BMI kg/m2 BSA m2 O2 Sat  "FR L/min FiO2        04/27/2021 09:22 AM         227lbs 4oz 5'  10\" 32.61 2.26             Physical Examination  · Constitutional  o Appearance  o : well developed, well-nourished, no obvious deformities present  · Head and Face  o Head  o :   § Inspection  § : normocephalic  o Face  o :   § Inspection  § : no facial lesions  · Eyes  o Conjunctivae  o : conjunctivae normal  o Sclerae  o : sclerae white  · Ears, Nose, Mouth and Throat  o Ears  o :   § External Ears  § : appearance within normal limits  § Hearing  § : intact  o Nose  o :   § External Nose  § : appearance normal  · Neck  o Inspection/Palpation  o : normal appearance  o Range of Motion  o : full range of motion  · Respiratory  o Respiratory Effort  o : breathing unlabored  o Inspection of Chest  o : normal appearance  o Auscultation of Lungs  o : no audible wheezing or rales  · Cardiovascular  o Heart  o : regular rate  · Gastrointestinal  o Abdominal Examination  o : soft and non-tender  · Skin and Subcutaneous Tissue  o General Inspection  o : intact, no rashes  · Psychiatric  o General  o : Alert and oriented x3  o Judgement and Insight  o : judgment and insight intact  o Mood and Affect  o : mood normal, affect appropriate  · Left Knee  o Inspection  o : ROM 0-90 degrees. Stable to anterior and posterior drawer. Stable to varus and valgus stress. Tender to anterior knee and medial joint line. Patella stable to stress. Pain with Isamar's. Mild swelling. No effusion. Positive EHL, FHL, GS, and TA. Sensation intact to light touch all 5 nerves of the foot. Positive Pulses.   · Injection Note/Aspiration Note  o Site  o : left knee  o Procedure  o : Procedure: After educating the patient, patient gave consent for procedure. After using Chloraprep, the joint space was injected. The patient tolerated the procedure well.  o Medication  o : 80 mg of DepoMedrol with 9cc of 1% Lidocaine  · Imaging  o Imaging  o : MRI [facility] 4/2021- Multifocal " full-thicknessand/ornear full-thicknesschondralfissuringatthemedialandcentral trochlea. 2. Diffusegrade3-4chondralthinningatthe weight-bearingportionofthemedialtibialplateau. 3. Otherwise, unremarkableMRIoftheknee.Themenisci,cruciate ligaments,and collateral ligamentsareintact.          Assessment  · Primary osteoarthritis of left knee     715.16/M17.12  · Left knee pain, unspecified chronicity     719.46/M25.562  · Chondromalacia of knee, left     717.7/M94.262      Plan  · Orders  o Depo-Medrol injection 80mg () - 715.16/M17.12 - 04/27/2021   Lot [id number] Exp 10 2021 [employer] Administered by KAMILAH BRIONES MD   o Knee Intra-articular Injection without US [facility] ([id number]) - 715.16/M17.12 - 04/27/2021   Lot [id number] Exp 12 01 2022 [employer] Administered by KAMILAH BRIONES MD   · Medications  o Medications have been Reconciled  o Transition of Care or Provider Policy  · Instructions  o Reviewed the patient's Past Medical, Social, and Family history as well as the ROS at today's visit, no changes.  o Call or return if worsening symptoms.  o The above service was scribed by Nelly uMltani on my behalf and I attest to the accuracy of the note. jsb  o Discussed the treatment options with the patient. I recommend conservative treatment with bracing, NSAIDS, HEP/physical therapy, and steroid injections. Discussed the risks and benefits of a steroid injection in his left knee. The patient expressed understanding and wished to proceed. He tolerated the injection well. Follow up in 6 weeks to recheck. Plan to seek approval for a Visco-supplementation for his left knee.   o Electronically Identified Patient Education Materials Provided Electronically  · Referrals  o ID: [id number] Date: 04/08/2021 Type: Inbound  Specialty: Orthopedic Surgery            Electronically Signed by: Nelly Multani MA -Author on April 27, 2021 10:00:45 AM  Electronically Co-signed by: Kamilah Briones MD  -Reviewer on April 27, 2021 08:50:16 PM

## 2021-06-01 ENCOUNTER — OFFICE VISIT CONVERTED (OUTPATIENT)
Dept: ORTHOPEDIC SURGERY | Facility: CLINIC | Age: 41
End: 2021-06-01
Attending: PHYSICIAN ASSISTANT

## 2021-06-06 NOTE — PROGRESS NOTES
Progress Note      Patient Name: Guanako Martinez   Patient ID: [id number]   Sex: Male   YOB: 1980        Visit Date: June 1, 2021    Provider: Javid Ruiz PA-C   Location: [facility]   Location Address: [address] [place] [address]  [id number]   Location Phone: [phone]          Chief Complaint  · Left knee pain      History Of Present Illness  Guanako Martinez is a 40 year old male who presents today to [facility]. Patient presents for follow-up evaluation of left knee pain, left knee chondromalacia. Patient was last seen by Dr. Aguirre on April 27, he received a steroid injection which he states did not help his knee pain. Patient points the anterior knee around the kneecap to the lateral knee As his area of worst pain. Patient had Synvisc injection approved he is here for his left knee Synvisc injection. He denies new injury, denies new symptoms of pain, states his pain is similar to past episodes.       Past Medical History  Seasonal allergies         Past Surgical History  Eye Implant         Medication List  Crestor 20 mg oral tablet; hydrochlorothiazide 12.5 mg oral capsule; Mobic 7.5 mg oral tablet; Naprosyn 500 mg oral tablet; Zanaflex 4 mg oral capsule; Zestril 20 mg oral tablet         Allergy List  NO KNOWN DRUG ALLERGIES       Allergies Reconciled  Family Medical History  Stroke         Social History  Alcohol Use (Current some day); lives alone; Recreational Drug Use (Never); Single.; Tobacco (Never); Working         Review of Systems  · Constitutional  o Denies  o : fever, chills, weight loss  · Cardiovascular  o Denies  o : chest pain, shortness of breath  · Gastrointestinal  o Denies  o : liver disease, heartburn, nausea, blood in stools  · Genitourinary  o Denies  o : painful urination, blood in urine  · Integument  o Denies  o : rash, itching  · Neurologic  o Denies  o : headache, weakness, loss of  "consciousness  · Musculoskeletal  o Denies  o : painful, swollen joints  · Psychiatric  o Denies  o : drug/alcohol addiction, anxiety, depression      Vitals  Date Time BP Position Site L\R Cuff Size HR RR TEMP (F) WT  HT  BMI kg/m2 BSA m2 O2 Sat FR L/min FiO2 HC       06/01/2021 10:43 AM      99 - R   219lbs 2oz 5'  10\" 31.44 2.22 97 %            Physical Examination  · Constitutional  o Appearance  o : well developed, well-nourished, no obvious deformities present  · Head and Face  o Head  o :   § Inspection  § : normocephalic  o Face  o :   § Inspection  § : no facial lesions  · Eyes  o Conjunctivae  o : conjunctivae normal  o Sclerae  o : sclerae white  · Ears, Nose, Mouth and Throat  o Ears  o :   § External Ears  § : appearance within normal limits  § Hearing  § : intact  o Nose  o :   § External Nose  § : appearance normal  · Neck  o Inspection/Palpation  o : normal appearance  o Range of Motion  o : full range of motion  · Respiratory  o Respiratory Effort  o : breathing unlabored  o Inspection of Chest  o : normal appearance  o Auscultation of Lungs  o : no audible wheezing or rales  · Cardiovascular  o Heart  o : regular rate  · Gastrointestinal  o Abdominal Examination  o : soft and non-tender  · Skin and Subcutaneous Tissue  o General Inspection  o : intact, no rashes  · Psychiatric  o General  o : Alert and oriented x3  o Judgement and Insight  o : judgment and insight intact  o Mood and Affect  o : mood normal, affect appropriate  · Left Knee  o Inspection  o : Skin is intact, no erythema, no ecchymosis, no swelling, no effusion, no signs of infection. Full extension, flexion 110, stable anterior/posterior drawer, stable to varus/valgus stress.  · Injection Note/Aspiration Note  o Site  o : left knee   o Procedure  o : Procedure: After educating the patient, patient gave consent for procedure. After using Chloraprep, the joint space was injected. The patient tolerated the procedure well. "   o Medication  o : Synvisc One 48 mg   · Imaging  o Imaging  o : MRI [facility] 4/2021- Multifocal full-thicknessand/ornear full-thicknesschondralfissuringatthemedialandcentral trochlea. 2. Diffusegrade3-4chondralthinningatthe weight-bearingportionofthemedialtibialplateau. 3. Otherwise, unremarkableMRIoftheknee.Themenisci,cruciate ligaments,and collateral ligamentsareintact.           Assessment  · Primary osteoarthritis of left knee     715.16/M17.12  · Left knee pain, unspecified chronicity     719.46/M25.562  · Chondromalacia, left knee     717.7/M94.262      Plan  · Orders  o Hyaluronan or derivative, Synvisc or Synvisc-One, for intra-vance () - 715.16/M17.12 - 06/01/2021   Lot [id number] exp 03 2023 [employer] Javid RATLIFF  o Knee Intra-articular Injection without US [facility] ([id number]) - 715.16/M17.12 - 06/01/2021   Javid RATLIFF  · Medications  o Medications have been Reconciled  o Transition of Care or Provider Policy  · Instructions  o Reviewed the patient's Past Medical, Social, and Family history as well as the ROS at today's visit, no changes.  o Call or return if worsening symptoms.  o Follow up in 6-8 weeks.  o Patient elected to have left knee Synvisc injection today which he tolerated well, follow-up in 6 to 8 weeks for reevaluation.  · Referrals  o ID: [id number] Date: 04/08/2021 Type: Inbound  Specialty: Orthopedic Surgery            Electronically Signed by: GAUDENCIO Koehler-C -Author on June 1, 2021 12:37:23 PM  Electronically Co-signed by: Sher Aguirre MD -Reviewer on June 1, 2021 10:13:35 PM

## 2021-07-15 VITALS — HEART RATE: 99 BPM | HEIGHT: 70 IN | BODY MASS INDEX: 31.37 KG/M2 | WEIGHT: 219.12 LBS | OXYGEN SATURATION: 97 %
